# Patient Record
(demographics unavailable — no encounter records)

---

## 2025-01-29 NOTE — PROCEDURE
[IUD Removal] : intrauterine device (IUD) removal [ IUD] :  IUD [Risks] : risks [Benefits] : benefits [Alternatives] : alternatives [Patient] : patient [IUD Removed - Forceps] : IUD removed - forceps [IUD Discarded] : IUD discarded [Tolerated Well] : Patient tolerated the procedure well [No Complications] : no complications [Heavy Vaginal Bleeding] : for heavy vaginal bleeding [Pelvic Pain] : for pelvic pain [PRN] : as needed

## 2025-01-29 NOTE — PLAN
[FreeTextEntry1] : 46 y/o female presents to establish care and IUD issues.   -IUD taken out today uncomplicated -Hormone test to assess menopause (to determine if pt need contraceptive) -discussed affects of alcohol withdrawl, has plan in place with PCP -Rx Lexapro started today, discussed risks understands if has thoughts of sucide, homicide to contact office ASAP/present to nearest ED pt has support at home   -RTO for annual  Carla Umaña MD

## 2025-01-29 NOTE — END OF VISIT
[FreeTextEntry3] : I, Carrieduardo Dawkinsanalisa, acted as a scribe on behalf of Dr. Umaña on 01/29/2025 .  All medical entries made by the scribe were at my, Dr. Umaña, direction and personally dictated by me on 01/29/2025 . I have reviewed the chart and agree that the record accurately reflects my personal performance of the history, physical exam, assessment and plan. I have also personally directed, reviewed, and agreed with the chart.

## 2025-01-29 NOTE — HISTORY OF PRESENT ILLNESS
[FreeTextEntry1] : 48 y/o female presents to establish care and desires to discuss IUD. Pt reports not having a period for a long time. She has had IUD Liletta for about 6+ years. She reports wearing a panty liner all the time, would get random spotting but doesn't pay much attention too.  Pt has significant hx of recent depression POPQ reviewed, saw PCP yesterday. Pt has been progressively drinking (3-4 white claws in the morning, progressively increasing), hasn't been going to work, denies thoughts of harm. Saw a PCP for the first time yesterday where testing was done, Pt therapist is currently getting her into a psychiatrist for medication. Pt has been experiencing increased depression and increased alcohol intake. lost weight, and does not eat as much. She gets shaky when not having alcohol. Pt with  today,  tearful.   Pt states that she is unsure if IUD contributing, but either way it might be  Unsure if she needs HRT  midway though appointment states she feels like leaving/ feeling tired  decided to remove IUD today and do hormonal bloood work

## 2025-01-29 NOTE — PHYSICAL EXAM
[Appropriately responsive] : appropriately responsive [Alert] : alert [No Acute Distress] : no acute distress [Non-distended] : non-distended [Oriented x3] : oriented x3